# Patient Record
(demographics unavailable — no encounter records)

---

## 2025-01-28 NOTE — DISCUSSION/SUMMARY
[de-identified] : The patient and their family member(s) were advised of the diagnosis. The natural history of the pathology was explained in full to the patient and the family in layman's terms.  spinal asymmetry no matthias scoliosis and she has reached skeletal maturity Here is the plan that we have set forth today. chance of progression is extremely low but happy to reassess clinically in 4 months. discussed maintenance of a healthy spine through good posture and exercise 150mins per week cardio and strength training. briefly touched on left hip- likely some hip flexor tendonitis- transient and not a big issue presently- return if it re-occurs. The patient and the family understands the plan of care as described above.  All questions have been answered. Thank you for allowing me to care for CARLEE. Sincerely, Carmen Livingston, DO, FAAP, CAQ[facility]

## 2025-01-28 NOTE — DATA REVIEWED
[FreeTextEntry1] : IMAGING:  X-Rays DATE: 7/5/24				 full report in chart. overall there is spinal asymmetry less than 10 deg, but no scoliosis	 RISER SIGN (0-5): 5

## 2025-01-28 NOTE — HISTORY OF PRESENT ILLNESS
[Student] : Work status: student [de-identified] : 1/28/25 Leila Muro, 15 years old. here with mom for  initial visit.  Is a new patient to me for a scoliosis check  Was seen by pcp initally- sent my X-ray July 2024 at  Pain level with movement 0 Pain level while resting - some intermittent muscle aches- presently intermittent left groin/hip Back is not painful  re: hip Quality of pain: dull ache, stiffness,  Pain Improves with  Pain worsens with laying flat on back Needs support with ambulation no Testing: xr scoliosis done at  08/2024 Treatment:  Student  menarche 2 years ago No family hx of scoliosis.  Review of Systems Constitutional: no fever, fatigue or recent weight loss HEENT: negative CV: negative Pulm: negative GI: negative : negative Neuro: negative Skin: negative Endocrine: negative Heme: negative MSK: See HPI.

## 2025-01-28 NOTE — REVIEW OF SYSTEMS
[Joint Pain] : joint pain [Joint Stiffness] : joint stiffness [FreeTextEntry9] : scoliosis, l groin

## 2025-01-28 NOTE — DISCUSSION/SUMMARY
[de-identified] : The patient and their family member(s) were advised of the diagnosis. The natural history of the pathology was explained in full to the patient and the family in layman's terms.  spinal asymmetry no matthias scoliosis and she has reached skeletal maturity Here is the plan that we have set forth today. chance of progression is extremely low but happy to reassess clinically in 4 months. discussed maintenance of a healthy spine through good posture and exercise 150mins per week cardio and strength training. briefly touched on left hip- likely some hip flexor tendonitis- transient and not a big issue presently- return if it re-occurs. The patient and the family understands the plan of care as described above.  All questions have been answered. Thank you for allowing me to care for CARLEE. Sincerely, Carmen Livingston, DO, FAAP, CAQ[facility]

## 2025-01-28 NOTE — IMAGING
[de-identified] : Constitutional: Healthy, and well nourished in no acute distress. Psych: Calm, cooperative, grossly normal Eyes: Normal, sclera non-icteric Ears, Nose, Mouth, Throat: External inspection of nose and ears does not reveal any scars or masses Head: Normocephalic Neck: Neck appears supple without sign of limited or painful ROM Respiratory: Normal effort, no respiratory distress, no cyanosis Cardiovascular: Visualized extremities without edema or varicosities, warm, brisk cap refill Abdominal/GI: Not examined Skin: No rashes on the extremity examined.  Neurological: Patient is awake and alert   Upper extremity motor strength: normal Patellar and Achilles deep tendon reflexes were normal Babinski test is not done Ankle clonus is absent Abdominal reflexes are symmetric   MUSCULOSKELETAL EXAM  Spine Exam: Shoulder Level: equal Iliac Crest Height: equal  Leg Lengths: no overt discrepancy seen Scapular Prominence: NO  Spine tenderness to palpation: NO Pain - forward bending:NO Pain - lateral bending:NO Pain- extension: NO Popliteal Angles - Bilateral:  Lag of -20 degrees  Carlos's Forward Bending Test: Cervical: None Thoracic:none  Lumbar:  left T_L prominence Sagittal Contour: Normal Curve stiffness:  Flexible

## 2025-01-28 NOTE — HISTORY OF PRESENT ILLNESS
[Student] : Work status: student [de-identified] : 1/28/25 Leila Muro, 15 years old. here with mom for  initial visit.  Is a new patient to me for a scoliosis check  Was seen by pcp initally- sent my X-ray July 2024 at  Pain level with movement 0 Pain level while resting - some intermittent muscle aches- presently intermittent left groin/hip Back is not painful  re: hip Quality of pain: dull ache, stiffness,  Pain Improves with  Pain worsens with laying flat on back Needs support with ambulation no Testing: xr scoliosis done at  08/2024 Treatment:  Student  menarche 2 years ago No family hx of scoliosis.  Review of Systems Constitutional: no fever, fatigue or recent weight loss HEENT: negative CV: negative Pulm: negative GI: negative : negative Neuro: negative Skin: negative Endocrine: negative Heme: negative MSK: See HPI.

## 2025-01-28 NOTE — IMAGING
[de-identified] : Constitutional: Healthy, and well nourished in no acute distress. Psych: Calm, cooperative, grossly normal Eyes: Normal, sclera non-icteric Ears, Nose, Mouth, Throat: External inspection of nose and ears does not reveal any scars or masses Head: Normocephalic Neck: Neck appears supple without sign of limited or painful ROM Respiratory: Normal effort, no respiratory distress, no cyanosis Cardiovascular: Visualized extremities without edema or varicosities, warm, brisk cap refill Abdominal/GI: Not examined Skin: No rashes on the extremity examined.  Neurological: Patient is awake and alert   Upper extremity motor strength: normal Patellar and Achilles deep tendon reflexes were normal Babinski test is not done Ankle clonus is absent Abdominal reflexes are symmetric   MUSCULOSKELETAL EXAM  Spine Exam: Shoulder Level: equal Iliac Crest Height: equal  Leg Lengths: no overt discrepancy seen Scapular Prominence: NO  Spine tenderness to palpation: NO Pain - forward bending:NO Pain - lateral bending:NO Pain- extension: NO Popliteal Angles - Bilateral:  Lag of -20 degrees  Carlos's Forward Bending Test: Cervical: None Thoracic:none  Lumbar:  left T_L prominence Sagittal Contour: Normal Curve stiffness:  Flexible